# Patient Record
Sex: MALE | Race: WHITE
[De-identification: names, ages, dates, MRNs, and addresses within clinical notes are randomized per-mention and may not be internally consistent; named-entity substitution may affect disease eponyms.]

---

## 2018-04-25 ENCOUNTER — HOSPITAL ENCOUNTER (OUTPATIENT)
Dept: HOSPITAL 90 - SHCH | Age: 68
Discharge: HOME | End: 2018-04-25
Attending: INTERNAL MEDICINE
Payer: COMMERCIAL

## 2018-04-25 DIAGNOSIS — I51.7: Primary | ICD-10-CM

## 2018-04-25 DIAGNOSIS — I20.9: ICD-10-CM

## 2018-04-25 PROCEDURE — 93306 TTE W/DOPPLER COMPLETE: CPT

## 2018-05-03 ENCOUNTER — HOSPITAL ENCOUNTER (OUTPATIENT)
Dept: HOSPITAL 90 - SHCH | Age: 68
Discharge: HOME | End: 2018-05-03
Attending: INTERNAL MEDICINE
Payer: COMMERCIAL

## 2018-05-03 DIAGNOSIS — G45.9: ICD-10-CM

## 2018-05-03 DIAGNOSIS — I20.9: Primary | ICD-10-CM

## 2018-05-03 PROCEDURE — 78452 HT MUSCLE IMAGE SPECT MULT: CPT

## 2018-05-03 PROCEDURE — 96374 THER/PROPH/DIAG INJ IV PUSH: CPT

## 2018-05-03 PROCEDURE — 93017 CV STRESS TEST TRACING ONLY: CPT

## 2018-05-18 ENCOUNTER — HOSPITAL ENCOUNTER (OUTPATIENT)
Dept: HOSPITAL 90 - SHCH | Age: 68
Discharge: HOME | End: 2018-05-18
Attending: INTERNAL MEDICINE
Payer: COMMERCIAL

## 2018-05-18 DIAGNOSIS — R09.89: Primary | ICD-10-CM

## 2018-05-18 PROCEDURE — 93880 EXTRACRANIAL BILAT STUDY: CPT

## 2018-10-06 ENCOUNTER — HOSPITAL ENCOUNTER (EMERGENCY)
Dept: HOSPITAL 90 - EDH | Age: 68
Discharge: HOME | End: 2018-10-06
Payer: COMMERCIAL

## 2018-10-06 DIAGNOSIS — S63.502A: Primary | ICD-10-CM
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
## 2018-10-06 DIAGNOSIS — S00.83XA: ICD-10-CM

## 2018-10-06 DIAGNOSIS — Z88.8: ICD-10-CM

## 2018-10-06 DIAGNOSIS — E11.40: ICD-10-CM

## 2018-10-06 DIAGNOSIS — Z79.899: ICD-10-CM
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
## 2018-10-06 DIAGNOSIS — E78.5: ICD-10-CM

## 2018-10-06 DIAGNOSIS — Z90.49: ICD-10-CM

## 2018-10-06 LAB
APTT PPP: 32 SEC (ref 26.3–35.5)
BASOPHILS NFR BLD AUTO: 0.3 % (ref 0–5)
BUN SERPL-MCNC: 15 MG/DL (ref 7–18)
CHLORIDE SERPL-SCNC: 98 MMOL/L (ref 101–111)
CO2 SERPL-SCNC: 29 MMOL/L (ref 21–32)
CREAT SERPL-MCNC: 1.2 MG/DL (ref 0.5–1.5)
EOSINOPHIL NFR BLD AUTO: 2.5 % (ref 0–8)
ERYTHROCYTE [DISTWIDTH] IN BLOOD BY AUTOMATED COUNT: 14 % (ref 11–15.5)
GFR SERPL CREATININE-BSD FRML MDRD: 64 ML/MIN (ref 60–?)
GLUCOSE SERPL-MCNC: 286 MG/DL (ref 70–105)
HCT VFR BLD AUTO: 44.2 % (ref 42–54)
INR PPP: 1.01 (ref 0.85–1.15)
LYMPHOCYTES NFR SPEC AUTO: 29.6 % (ref 21–51)
MCH RBC QN AUTO: 30.1 PG (ref 27–33)
MCHC RBC AUTO-ENTMCNC: 33.5 G/DL (ref 32–36)
MCV RBC AUTO: 89.7 FL (ref 79–99)
MONOCYTES NFR BLD AUTO: 8.5 % (ref 3–13)
NEUTROPHILS NFR BLD AUTO: 59.1 % (ref 40–77)
NRBC BLD MANUAL-RTO: 0.1 % (ref 0–0.19)
PLATELET # BLD AUTO: 205 K/UL (ref 130–400)
POTASSIUM SERPL-SCNC: 4.2 MMOL/L (ref 3.5–5.1)
PROTHROMBIN TIME: 10.6 SEC (ref 9.6–11.6)
RBC # BLD AUTO: 4.92 MIL/UL (ref 4.5–6.2)
SODIUM SERPL-SCNC: 136 MMOL/L (ref 136–145)
WBC # BLD AUTO: 11.8 K/UL (ref 4.8–10.8)

## 2018-10-06 PROCEDURE — 85730 THROMBOPLASTIN TIME PARTIAL: CPT

## 2018-10-06 PROCEDURE — 73090 X-RAY EXAM OF FOREARM: CPT

## 2018-10-06 PROCEDURE — 36415 COLL VENOUS BLD VENIPUNCTURE: CPT

## 2018-10-06 PROCEDURE — 70450 CT HEAD/BRAIN W/O DYE: CPT

## 2018-10-06 PROCEDURE — 93005 ELECTROCARDIOGRAM TRACING: CPT

## 2018-10-06 PROCEDURE — 85025 COMPLETE CBC W/AUTO DIFF WBC: CPT

## 2018-10-06 PROCEDURE — 73130 X-RAY EXAM OF HAND: CPT

## 2018-10-06 PROCEDURE — 80048 BASIC METABOLIC PNL TOTAL CA: CPT

## 2018-10-06 PROCEDURE — 85610 PROTHROMBIN TIME: CPT

## 2025-03-30 NOTE — NUR
SPOKE WITH [facility], PATIENT'S DAUGHTER WILL BE CONTACTED ABOUT 
TISSUE DONATION

## 2025-03-30 NOTE — ERN
General


Chief Complaint:  CPR/Full Arrest


Stated Complaint:  CARDIAC ARREST


Time Seen by MD:  19:33





History of Present Illness


Initial Comments


Patient arrived with full cardiac arrest.  Airway established in the field 

forget with the ENT called at that I do not think it was a secure airway.  In 

either case respiratory therapist was bagging the patient through the airway and

and able to maintain good oxygen saturations.  CPR was initiated immediately 

ACLS protocol initiated immediately please see the code sheet for details.  We 

ended up giving the patient multiple doses of epinephrine, bicarb, amiodarone, 

and shocked him 3 times.  After approximately 20 minutes of chest compressions 

in the ED and 40 minutes of total down time, including the time at the scene, 

the patient is still had asystole on a monitor.  Checking with the resuscitation

team we agreed we had done all aspects of the ACLS protocol and that further 

resuscitation efforts would be futile.  Resuscitation stopped.


Allergies:  


Coded Allergies:  


     nitrous oxide (Unverified  Allergy, Severe, 18)


   hypertension


Home Meds


Active Scripts


Azithromycin (Zithromax) 250 Mg Tablet, 500 MG PO Q24H, #2 TAB


   Prov:SAMY TEMPLE         24


Oseltamivir Phosphate (Tamiflu) 75 Mg Cap, 75 MG PO BID for 2 Days, #4 CAP 0 

Refills


   Prov:SAMY TEMPLE         24


Reported Medications


Fluticasone Propion/Salmeterol (Wixela 250-50 Inhub) 250 Mcg-50 Mcg/Dose 

Blst.w.dev, 1 EACH IH BID


   24


Albuterol Sulfate (Albuterol Sulfate) 5 Mg/Ml Solution, 5 MG IH BID, ML


   24


Levothyroxine Sodium (Levothyroxine) 50 Mcg Capsule, 1 CAP PO DAILY for 30 Days,

#30 CAP 0 Refills


   24


Acarbose (Acarbose) 25 Mg Tablet, 25 MG PO TID, TAB


   24


Donepezil HCl (Donepezil HCl) 10 Mg Tab.rapdis, 1 TAB PO DAILY for 30 Days, #30 

TAB 0 Refills


   24


Metformin HCl (Metformin HCl) 1,000 Mg Tablet, 1 TAB PO BID for 30 Days, #60 TAB

0 Refills


   24


Empagliflozin (Jardiance) 25 Mg Tablet, 1 TAB PO DAILY for 30 Days, #30 TAB 0 

Refills


   24


Lisinopril (Lisinopril) 2.5 Mg Tablet, 1 TAB PO DAILY for 30 Days, #30 TAB 0 

Refills


   24


Finasteride (Finasteride) 5 Mg Tablet, 1 TAB PO DAILY for 30 Days, #30 TAB 0 

Refills


   24


Duloxetine HCl (Duloxetine HCl) 60 Mg Capsule.dr, 1 CAP PO DAILY for 30 Days, 

#30 CAP 0 Refills


   24


Atorvastatin Calcium (Atorvastatin Calcium) 40 Mg Tablet, 1 TAB PO DAILY for 30 

Days, #30 TAB 0 Refills


   24


Tamsulosin HCl (Flomax) 0.4 Mg Cap.er.24h, 1 CAP PO DAILY for 30 Days, #30 CAP 0

Refills


   24





Past Medical History


Past Medical History:  Diabetes-Type II, Hypothyroid, Other


Medical History Other:  CHRONIC BRONCHITIS


Past Surgical History:  Appendectomy, Cholecystectomy, Other


Surgical History Other:  BILATER ING HERNIA REPAIR, ENDO/COLONOSCOPY





ROS Dictation


Unable to obtain





Physical Exam


Physical Exam Dictation


Patient in obvious extremis carotid pulse on palpable two out entire time in a 

emergency rooms.





MDM


Patient most likely arrived in asystole and remained in a asystole throughout 

the code.  One point maybe there was fine VFib and therefore we did give 

amiodarone and shocked patient two or 3 times.  But the patient really I think 

was in asystole the entire time.  Chest compressions were adequate throughout 

the entire code.





DX & DISP


Disposition:  


Departure


Impression:  


   Primary Impression:  Cardiac asystole


Condition:  Stable


Referrals:  


KEN RUSH MD (PCP)











VERONICA BISWAS MD           Mar 30, 2025 19:39